# Patient Record
Sex: FEMALE | Race: BLACK OR AFRICAN AMERICAN | NOT HISPANIC OR LATINO
[De-identification: names, ages, dates, MRNs, and addresses within clinical notes are randomized per-mention and may not be internally consistent; named-entity substitution may affect disease eponyms.]

---

## 2018-04-03 PROBLEM — Z00.00 ENCOUNTER FOR PREVENTIVE HEALTH EXAMINATION: Status: ACTIVE | Noted: 2018-04-03

## 2018-04-10 ENCOUNTER — ASOB RESULT (OUTPATIENT)
Age: 38
End: 2018-04-10

## 2018-04-10 ENCOUNTER — APPOINTMENT (OUTPATIENT)
Dept: MATERNAL FETAL MEDICINE | Facility: CLINIC | Age: 38
End: 2018-04-10
Payer: MEDICAID

## 2018-04-10 PROCEDURE — G0109 DIAB MANAGE TRN IND/GROUP: CPT

## 2018-04-11 RX ORDER — BLOOD SUGAR DIAGNOSTIC
STRIP MISCELLANEOUS 4 TIMES DAILY
Qty: 1 | Refills: 4 | Status: ACTIVE | COMMUNITY
Start: 2018-04-10 | End: 1900-01-01

## 2018-04-11 RX ORDER — LANCETS
EACH MISCELLANEOUS
Qty: 1 | Refills: 4 | Status: ACTIVE | COMMUNITY
Start: 2018-04-10 | End: 1900-01-01

## 2018-04-26 ENCOUNTER — ASOB RESULT (OUTPATIENT)
Age: 38
End: 2018-04-26

## 2018-04-26 ENCOUNTER — APPOINTMENT (OUTPATIENT)
Dept: MATERNAL FETAL MEDICINE | Facility: CLINIC | Age: 38
End: 2018-04-26
Payer: MEDICAID

## 2018-04-26 PROCEDURE — G0108 DIAB MANAGE TRN  PER INDIV: CPT

## 2018-05-10 ENCOUNTER — OUTPATIENT (OUTPATIENT)
Dept: OUTPATIENT SERVICES | Facility: HOSPITAL | Age: 38
LOS: 1 days | End: 2018-05-10

## 2018-05-10 ENCOUNTER — ASOB RESULT (OUTPATIENT)
Age: 38
End: 2018-05-10

## 2018-05-10 ENCOUNTER — APPOINTMENT (OUTPATIENT)
Dept: ANTEPARTUM | Facility: CLINIC | Age: 38
End: 2018-05-10
Payer: MEDICAID

## 2018-05-10 ENCOUNTER — APPOINTMENT (OUTPATIENT)
Dept: ANTEPARTUM | Facility: HOSPITAL | Age: 38
End: 2018-05-10

## 2018-05-10 ENCOUNTER — APPOINTMENT (OUTPATIENT)
Dept: MATERNAL FETAL MEDICINE | Facility: CLINIC | Age: 38
End: 2018-05-10
Payer: MEDICAID

## 2018-05-10 PROCEDURE — 59025 FETAL NON-STRESS TEST: CPT | Mod: 26

## 2018-05-10 PROCEDURE — 76805 OB US >/= 14 WKS SNGL FETUS: CPT

## 2018-05-10 PROCEDURE — G0108 DIAB MANAGE TRN  PER INDIV: CPT

## 2018-05-16 DIAGNOSIS — O99.213 OBESITY COMPLICATING PREGNANCY, THIRD TRIMESTER: ICD-10-CM

## 2018-05-16 DIAGNOSIS — O09.523 SUPERVISION OF ELDERLY MULTIGRAVIDA, THIRD TRIMESTER: ICD-10-CM

## 2018-05-16 DIAGNOSIS — O24.419 GESTATIONAL DIABETES MELLITUS IN PREGNANCY, UNSPECIFIED CONTROL: ICD-10-CM

## 2018-05-17 ENCOUNTER — APPOINTMENT (OUTPATIENT)
Dept: ANTEPARTUM | Facility: HOSPITAL | Age: 38
End: 2018-05-17

## 2018-05-17 ENCOUNTER — ASOB RESULT (OUTPATIENT)
Age: 38
End: 2018-05-17

## 2018-05-17 ENCOUNTER — OUTPATIENT (OUTPATIENT)
Dept: OUTPATIENT SERVICES | Facility: HOSPITAL | Age: 38
LOS: 1 days | End: 2018-05-17

## 2018-05-17 ENCOUNTER — APPOINTMENT (OUTPATIENT)
Dept: ANTEPARTUM | Facility: CLINIC | Age: 38
End: 2018-05-17
Payer: MEDICAID

## 2018-05-17 PROCEDURE — 76818 FETAL BIOPHYS PROFILE W/NST: CPT | Mod: 26

## 2018-05-24 ENCOUNTER — ASOB RESULT (OUTPATIENT)
Age: 38
End: 2018-05-24

## 2018-05-24 ENCOUNTER — OUTPATIENT (OUTPATIENT)
Dept: OUTPATIENT SERVICES | Facility: HOSPITAL | Age: 38
LOS: 1 days | End: 2018-05-24

## 2018-05-24 ENCOUNTER — APPOINTMENT (OUTPATIENT)
Dept: ANTEPARTUM | Facility: CLINIC | Age: 38
End: 2018-05-24
Payer: MEDICAID

## 2018-05-24 ENCOUNTER — APPOINTMENT (OUTPATIENT)
Dept: ANTEPARTUM | Facility: HOSPITAL | Age: 38
End: 2018-05-24

## 2018-05-24 PROCEDURE — 76818 FETAL BIOPHYS PROFILE W/NST: CPT | Mod: 26

## 2018-05-29 DIAGNOSIS — O40.3XX0 POLYHYDRAMNIOS, THIRD TRIMESTER, NOT APPLICABLE OR UNSPECIFIED: ICD-10-CM

## 2018-05-29 DIAGNOSIS — O24.419 GESTATIONAL DIABETES MELLITUS IN PREGNANCY, UNSPECIFIED CONTROL: ICD-10-CM

## 2018-05-29 DIAGNOSIS — O99.213 OBESITY COMPLICATING PREGNANCY, THIRD TRIMESTER: ICD-10-CM

## 2018-05-29 DIAGNOSIS — O09.523 SUPERVISION OF ELDERLY MULTIGRAVIDA, THIRD TRIMESTER: ICD-10-CM

## 2018-05-29 DIAGNOSIS — Z3A.38 38 WEEKS GESTATION OF PREGNANCY: ICD-10-CM

## 2018-05-31 ENCOUNTER — ASOB RESULT (OUTPATIENT)
Age: 38
End: 2018-05-31

## 2018-05-31 ENCOUNTER — APPOINTMENT (OUTPATIENT)
Dept: ANTEPARTUM | Facility: HOSPITAL | Age: 38
End: 2018-05-31

## 2018-05-31 ENCOUNTER — APPOINTMENT (OUTPATIENT)
Dept: ANTEPARTUM | Facility: CLINIC | Age: 38
End: 2018-05-31
Payer: MEDICAID

## 2018-05-31 PROCEDURE — 76816 OB US FOLLOW-UP PER FETUS: CPT

## 2018-05-31 PROCEDURE — 76818 FETAL BIOPHYS PROFILE W/NST: CPT | Mod: 26

## 2018-06-06 ENCOUNTER — RESULT REVIEW (OUTPATIENT)
Age: 38
End: 2018-06-06

## 2018-06-06 ENCOUNTER — INPATIENT (INPATIENT)
Facility: HOSPITAL | Age: 38
LOS: 1 days | Discharge: HOME | End: 2018-06-08
Attending: OBSTETRICS & GYNECOLOGY | Admitting: OBSTETRICS & GYNECOLOGY
Payer: MEDICAID

## 2018-06-06 VITALS — WEIGHT: 180.78 LBS | HEIGHT: 64 IN

## 2018-06-06 DIAGNOSIS — Z3A.00 WEEKS OF GESTATION OF PREGNANCY NOT SPECIFIED: ICD-10-CM

## 2018-06-06 DIAGNOSIS — O26.90 PREGNANCY RELATED CONDITIONS, UNSPECIFIED, UNSPECIFIED TRIMESTER: ICD-10-CM

## 2018-06-06 LAB
BASOPHILS # BLD AUTO: 0.03 K/UL — SIGNIFICANT CHANGE UP (ref 0–0.2)
BASOPHILS NFR BLD AUTO: 0.3 % — SIGNIFICANT CHANGE UP (ref 0–2)
BLD GP AB SCN SERPL QL: NEGATIVE — SIGNIFICANT CHANGE UP
EOSINOPHIL # BLD AUTO: 0.05 K/UL — SIGNIFICANT CHANGE UP (ref 0–0.5)
EOSINOPHIL NFR BLD AUTO: 0.5 % — SIGNIFICANT CHANGE UP (ref 0–6)
HCT VFR BLD CALC: 37.7 % — SIGNIFICANT CHANGE UP (ref 34.5–45)
HGB BLD-MCNC: 12.1 G/DL — SIGNIFICANT CHANGE UP (ref 11.5–15.5)
IMM GRANULOCYTES # BLD AUTO: 0.04 # — SIGNIFICANT CHANGE UP
IMM GRANULOCYTES NFR BLD AUTO: 0.4 % — SIGNIFICANT CHANGE UP (ref 0–1.5)
LYMPHOCYTES # BLD AUTO: 2.76 K/UL — SIGNIFICANT CHANGE UP (ref 1–3.3)
LYMPHOCYTES # BLD AUTO: 25.7 % — SIGNIFICANT CHANGE UP (ref 13–44)
MCHC RBC-ENTMCNC: 27 PG — SIGNIFICANT CHANGE UP (ref 27–34)
MCHC RBC-ENTMCNC: 32.1 % — SIGNIFICANT CHANGE UP (ref 32–36)
MCV RBC AUTO: 84.2 FL — SIGNIFICANT CHANGE UP (ref 80–100)
MONOCYTES # BLD AUTO: 0.89 K/UL — SIGNIFICANT CHANGE UP (ref 0–0.9)
MONOCYTES NFR BLD AUTO: 8.3 % — SIGNIFICANT CHANGE UP (ref 2–14)
NEUTROPHILS # BLD AUTO: 6.95 K/UL — SIGNIFICANT CHANGE UP (ref 1.8–7.4)
NEUTROPHILS NFR BLD AUTO: 64.8 % — SIGNIFICANT CHANGE UP (ref 43–77)
NRBC # FLD: 0 — SIGNIFICANT CHANGE UP
PLATELET # BLD AUTO: 194 K/UL — SIGNIFICANT CHANGE UP (ref 150–400)
PMV BLD: 11.2 FL — SIGNIFICANT CHANGE UP (ref 7–13)
RBC # BLD: 4.48 M/UL — SIGNIFICANT CHANGE UP (ref 3.8–5.2)
RBC # FLD: 15 % — HIGH (ref 10.3–14.5)
RH IG SCN BLD-IMP: POSITIVE — SIGNIFICANT CHANGE UP
RH IG SCN BLD-IMP: POSITIVE — SIGNIFICANT CHANGE UP
T PALLIDUM AB TITR SER: NEGATIVE — SIGNIFICANT CHANGE UP
WBC # BLD: 10.72 K/UL — HIGH (ref 3.8–10.5)
WBC # FLD AUTO: 10.72 K/UL — HIGH (ref 3.8–10.5)

## 2018-06-06 PROCEDURE — 88307 TISSUE EXAM BY PATHOLOGIST: CPT | Mod: 26

## 2018-06-06 RX ORDER — OXYTOCIN 10 UNIT/ML
41.67 VIAL (ML) INJECTION
Qty: 20 | Refills: 0 | Status: DISCONTINUED | OUTPATIENT
Start: 2018-06-06 | End: 2018-06-07

## 2018-06-06 RX ORDER — OXYCODONE HYDROCHLORIDE 5 MG/1
5 TABLET ORAL EVERY 4 HOURS
Qty: 0 | Refills: 0 | Status: DISCONTINUED | OUTPATIENT
Start: 2018-06-06 | End: 2018-06-08

## 2018-06-06 RX ORDER — LANOLIN
1 OINTMENT (GRAM) TOPICAL EVERY 6 HOURS
Qty: 0 | Refills: 0 | Status: DISCONTINUED | OUTPATIENT
Start: 2018-06-06 | End: 2018-06-08

## 2018-06-06 RX ORDER — OXYTOCIN 10 UNIT/ML
333.33 VIAL (ML) INJECTION
Qty: 20 | Refills: 0 | Status: COMPLETED | OUTPATIENT
Start: 2018-06-06 | End: 2018-06-06

## 2018-06-06 RX ORDER — SIMETHICONE 80 MG/1
80 TABLET, CHEWABLE ORAL EVERY 6 HOURS
Qty: 0 | Refills: 0 | Status: DISCONTINUED | OUTPATIENT
Start: 2018-06-06 | End: 2018-06-08

## 2018-06-06 RX ORDER — SODIUM CHLORIDE 9 MG/ML
1000 INJECTION, SOLUTION INTRAVENOUS
Qty: 0 | Refills: 0 | Status: DISCONTINUED | OUTPATIENT
Start: 2018-06-06 | End: 2018-06-06

## 2018-06-06 RX ORDER — KETOROLAC TROMETHAMINE 30 MG/ML
30 SYRINGE (ML) INJECTION ONCE
Qty: 0 | Refills: 0 | Status: DISCONTINUED | OUTPATIENT
Start: 2018-06-06 | End: 2018-06-06

## 2018-06-06 RX ORDER — SODIUM CHLORIDE 9 MG/ML
1000 INJECTION, SOLUTION INTRAVENOUS ONCE
Qty: 0 | Refills: 0 | Status: COMPLETED | OUTPATIENT
Start: 2018-06-06 | End: 2018-06-06

## 2018-06-06 RX ORDER — TETANUS TOXOID, REDUCED DIPHTHERIA TOXOID AND ACELLULAR PERTUSSIS VACCINE, ADSORBED 5; 2.5; 8; 8; 2.5 [IU]/.5ML; [IU]/.5ML; UG/.5ML; UG/.5ML; UG/.5ML
0.5 SUSPENSION INTRAMUSCULAR ONCE
Qty: 0 | Refills: 0 | Status: DISCONTINUED | OUTPATIENT
Start: 2018-06-06 | End: 2018-06-08

## 2018-06-06 RX ORDER — DOCUSATE SODIUM 100 MG
100 CAPSULE ORAL
Qty: 0 | Refills: 0 | Status: DISCONTINUED | OUTPATIENT
Start: 2018-06-06 | End: 2018-06-08

## 2018-06-06 RX ORDER — AER TRAVELER 0.5 G/1
1 SOLUTION RECTAL; TOPICAL EVERY 4 HOURS
Qty: 0 | Refills: 0 | Status: DISCONTINUED | OUTPATIENT
Start: 2018-06-06 | End: 2018-06-08

## 2018-06-06 RX ORDER — MAGNESIUM HYDROXIDE 400 MG/1
30 TABLET, CHEWABLE ORAL
Qty: 0 | Refills: 0 | Status: DISCONTINUED | OUTPATIENT
Start: 2018-06-06 | End: 2018-06-08

## 2018-06-06 RX ORDER — ACETAMINOPHEN 500 MG
975 TABLET ORAL EVERY 6 HOURS
Qty: 0 | Refills: 0 | Status: DISCONTINUED | OUTPATIENT
Start: 2018-06-06 | End: 2018-06-06

## 2018-06-06 RX ORDER — SODIUM CHLORIDE 9 MG/ML
3 INJECTION INTRAMUSCULAR; INTRAVENOUS; SUBCUTANEOUS EVERY 8 HOURS
Qty: 0 | Refills: 0 | Status: DISCONTINUED | OUTPATIENT
Start: 2018-06-06 | End: 2018-06-08

## 2018-06-06 RX ORDER — OXYTOCIN 10 UNIT/ML
41.67 VIAL (ML) INJECTION
Qty: 20 | Refills: 0 | Status: DISCONTINUED | OUTPATIENT
Start: 2018-06-06 | End: 2018-06-06

## 2018-06-06 RX ORDER — HYDROCORTISONE 1 %
1 OINTMENT (GRAM) TOPICAL EVERY 4 HOURS
Qty: 0 | Refills: 0 | Status: DISCONTINUED | OUTPATIENT
Start: 2018-06-06 | End: 2018-06-08

## 2018-06-06 RX ORDER — GLYCERIN ADULT
1 SUPPOSITORY, RECTAL RECTAL AT BEDTIME
Qty: 0 | Refills: 0 | Status: DISCONTINUED | OUTPATIENT
Start: 2018-06-06 | End: 2018-06-08

## 2018-06-06 RX ORDER — HYDROCORTISONE 1 %
1 OINTMENT (GRAM) TOPICAL EVERY 4 HOURS
Qty: 0 | Refills: 0 | Status: DISCONTINUED | OUTPATIENT
Start: 2018-06-06 | End: 2018-06-06

## 2018-06-06 RX ORDER — IBUPROFEN 200 MG
600 TABLET ORAL EVERY 6 HOURS
Qty: 0 | Refills: 0 | Status: DISCONTINUED | OUTPATIENT
Start: 2018-06-06 | End: 2018-06-08

## 2018-06-06 RX ORDER — ACETAMINOPHEN 500 MG
975 TABLET ORAL EVERY 6 HOURS
Qty: 0 | Refills: 0 | Status: COMPLETED | OUTPATIENT
Start: 2018-06-06 | End: 2019-05-05

## 2018-06-06 RX ORDER — OXYTOCIN 10 UNIT/ML
333.33 VIAL (ML) INJECTION
Qty: 20 | Refills: 0 | Status: COMPLETED | OUTPATIENT
Start: 2018-06-06

## 2018-06-06 RX ORDER — OXYCODONE HYDROCHLORIDE 5 MG/1
5 TABLET ORAL
Qty: 0 | Refills: 0 | Status: DISCONTINUED | OUTPATIENT
Start: 2018-06-06 | End: 2018-06-08

## 2018-06-06 RX ORDER — PRAMOXINE HYDROCHLORIDE 150 MG/15G
1 AEROSOL, FOAM RECTAL EVERY 4 HOURS
Qty: 0 | Refills: 0 | Status: DISCONTINUED | OUTPATIENT
Start: 2018-06-06 | End: 2018-06-06

## 2018-06-06 RX ORDER — AER TRAVELER 0.5 G/1
1 SOLUTION RECTAL; TOPICAL EVERY 4 HOURS
Qty: 0 | Refills: 0 | Status: DISCONTINUED | OUTPATIENT
Start: 2018-06-06 | End: 2018-06-06

## 2018-06-06 RX ORDER — DIPHENHYDRAMINE HCL 50 MG
25 CAPSULE ORAL EVERY 6 HOURS
Qty: 0 | Refills: 0 | Status: DISCONTINUED | OUTPATIENT
Start: 2018-06-06 | End: 2018-06-08

## 2018-06-06 RX ORDER — ACETAMINOPHEN 500 MG
975 TABLET ORAL EVERY 6 HOURS
Qty: 0 | Refills: 0 | Status: DISCONTINUED | OUTPATIENT
Start: 2018-06-06 | End: 2018-06-08

## 2018-06-06 RX ORDER — DIBUCAINE 1 %
1 OINTMENT (GRAM) RECTAL EVERY 4 HOURS
Qty: 0 | Refills: 0 | Status: DISCONTINUED | OUTPATIENT
Start: 2018-06-06 | End: 2018-06-08

## 2018-06-06 RX ORDER — DIBUCAINE 1 %
1 OINTMENT (GRAM) RECTAL EVERY 4 HOURS
Qty: 0 | Refills: 0 | Status: DISCONTINUED | OUTPATIENT
Start: 2018-06-06 | End: 2018-06-06

## 2018-06-06 RX ORDER — SODIUM CHLORIDE 9 MG/ML
3 INJECTION INTRAMUSCULAR; INTRAVENOUS; SUBCUTANEOUS EVERY 8 HOURS
Qty: 0 | Refills: 0 | Status: DISCONTINUED | OUTPATIENT
Start: 2018-06-06 | End: 2018-06-06

## 2018-06-06 RX ORDER — IBUPROFEN 200 MG
600 TABLET ORAL EVERY 6 HOURS
Qty: 0 | Refills: 0 | Status: DISCONTINUED | OUTPATIENT
Start: 2018-06-06 | End: 2018-06-06

## 2018-06-06 RX ORDER — IBUPROFEN 200 MG
600 TABLET ORAL EVERY 6 HOURS
Qty: 0 | Refills: 0 | Status: COMPLETED | OUTPATIENT
Start: 2018-06-06 | End: 2019-05-05

## 2018-06-06 RX ORDER — PRAMOXINE HYDROCHLORIDE 150 MG/15G
1 AEROSOL, FOAM RECTAL EVERY 4 HOURS
Qty: 0 | Refills: 0 | Status: DISCONTINUED | OUTPATIENT
Start: 2018-06-06 | End: 2018-06-08

## 2018-06-06 RX ADMIN — Medication 125 MILLIUNIT(S)/MIN: at 10:03

## 2018-06-06 RX ADMIN — Medication 1000 MILLIUNIT(S)/MIN: at 10:02

## 2018-06-06 RX ADMIN — Medication 975 MILLIGRAM(S): at 23:57

## 2018-06-06 RX ADMIN — Medication 975 MILLIGRAM(S): at 18:15

## 2018-06-06 RX ADMIN — Medication 600 MILLIGRAM(S): at 17:45

## 2018-06-06 RX ADMIN — Medication 600 MILLIGRAM(S): at 18:15

## 2018-06-06 RX ADMIN — Medication 975 MILLIGRAM(S): at 17:45

## 2018-06-06 RX ADMIN — SODIUM CHLORIDE 3 MILLILITER(S): 9 INJECTION INTRAMUSCULAR; INTRAVENOUS; SUBCUTANEOUS at 22:00

## 2018-06-06 RX ADMIN — Medication 30 MILLIGRAM(S): at 10:03

## 2018-06-06 RX ADMIN — Medication 600 MILLIGRAM(S): at 23:57

## 2018-06-06 RX ADMIN — SODIUM CHLORIDE 2000 MILLILITER(S): 9 INJECTION, SOLUTION INTRAVENOUS at 04:47

## 2018-06-06 RX ADMIN — Medication 30 MILLIGRAM(S): at 10:22

## 2018-06-06 RX ADMIN — Medication 25 MILLIGRAM(S): at 10:03

## 2018-06-07 RX ADMIN — Medication 975 MILLIGRAM(S): at 06:10

## 2018-06-07 RX ADMIN — SODIUM CHLORIDE 3 MILLILITER(S): 9 INJECTION INTRAMUSCULAR; INTRAVENOUS; SUBCUTANEOUS at 21:58

## 2018-06-07 RX ADMIN — Medication 600 MILLIGRAM(S): at 00:10

## 2018-06-07 RX ADMIN — Medication 600 MILLIGRAM(S): at 06:10

## 2018-06-07 RX ADMIN — Medication 600 MILLIGRAM(S): at 05:49

## 2018-06-07 RX ADMIN — Medication 975 MILLIGRAM(S): at 00:10

## 2018-06-07 RX ADMIN — Medication 1 TABLET(S): at 14:56

## 2018-06-07 RX ADMIN — Medication 975 MILLIGRAM(S): at 05:49

## 2018-06-07 RX ADMIN — Medication 100 MILLIGRAM(S): at 14:55

## 2018-06-07 NOTE — PROGRESS NOTE ADULT - ASSESSMENT
Assessment:  stable postpartum  labs wnl  breastfeeding well    Plan:  encourage ambulation and po fluids  Encourage breastfeeding  anticipate discharge home tomorrow Assessment:    1. PPD# 1 s/p   2. GDM-A1    Stable postpartum  FS WNL  Breastfeeding well  Encourage ambulation and po fluids  Encourage breastfeeding  Anticipate discharge home tomorrow  D/C instructions given  Will need 2h GTT at [facility]  Motrin PRN for pain  F/U in 5 to 6 weeks with Dr. Augustin Assessment:    Pt. speaks Creole and some english, her sister-in-law interpreted for her MsPalma Cuevas    1. PPD# 1 s/p   2. GDM-A1    Stable postpartum  FS WNL  Breastfeeding well  Encourage ambulation and po fluids  Encourage breastfeeding  Anticipate discharge home tomorrow  D/C instructions given  Will need 2h GTT at [facility]  Motrin PRN for pain  F/U in 5 to 6 weeks with Dr. Augustin Assessment:    Pt. speaks Creole and some english, her sister-in-law interpreted for her MsPalma Cuevas    1. PPD# 1 s/p   2. GDM-A1    Stable postpartum  FS WNL  Breastfeeding well  Encourage ambulation and po fluids  Encourage breastfeeding  Anticipate discharge home tomorrow  D/C instructions given  Will need 2h GTT at [facility]  Motrin PRN for pain  F/U in 5 to 6 weeks with Dr. Chan

## 2018-06-07 NOTE — PROGRESS NOTE ADULT - SUBJECTIVE AND OBJECTIVE BOX
OB Attending on call:    Patient evaluated at bedside.  Patient's pain is well controlled with motrin. Patient denies headache, dizziness, chest pain, palpitations, shortness of breath, nausea, vomiting, heavy vaginal bleeding or perineal discomfort. Patient has been ambulating without assistance, voiding spontaneously, tolerating diet, and is breastfeeding.    Physical Exam:  Vital Signs Last 24 Hrs  T(C): 36.6 (07 Jun 2018 05:45), Max: 36.8 (06 Jun 2018 18:35)  T(F): 97.8 (07 Jun 2018 05:45), Max: 98.3 (06 Jun 2018 18:35)  HR: 79 (07 Jun 2018 05:45) (79 - 87)  BP: 131/80 (07 Jun 2018 05:45) (116/65 - 131/80)  BP(mean): --  RR: 18 (07 Jun 2018 05:45) (18 - 19)  SpO2: 100% (07 Jun 2018 05:45) (99% - 100%)  I&O's Summary    06 Jun 2018 07:01  -  07 Jun 2018 07:00  --------------------------------------------------------  IN: 0 mL / OUT: 1600 mL / NET: -1600 mL        NAD, A+0 x 3  Breasts: soft, nontender, no palpable masses, nipples intact and everted  Abd:  soft, nontender, nondistended, no rebound or guarding, uterus firm at midline,   : lochia WNL  Extremities: no edema or calf tenderness bilaterally                        12.1   10.72 )-----------( 194      ( 06 Jun 2018 04:00 )             37.7 OB Attending on call: PPD#1 s/p     Patient evaluated at bedside.  Patient's pain is well controlled with motrin. Patient denies headache, dizziness, chest pain, palpitations, shortness of breath, nausea, vomiting, heavy vaginal bleeding or perineal discomfort. Patient has been ambulating without assistance, voiding spontaneously, tolerating diet, and is breastfeeding.    Physical Exam:  Vital Signs Last 24 Hrs  T(C): 36.6 (2018 05:45), Max: 36.8 (2018 18:35)  T(F): 97.8 (2018 05:45), Max: 98.3 (2018 18:35)  HR: 79 (2018 05:45) (79 - 87)  BP: 131/80 (2018 05:45) (116/65 - 131/80)  BP(mean): --  RR: 18 (2018 05:45) (18 - 19)  SpO2: 100% (2018 05:45) (99% - 100%)  I&O's Summary    2018 07:01  -  2018 07:00  --------------------------------------------------------  IN: 0 mL / OUT: 1600 mL / NET: -1600 mL        NAD, A+0 x 3  Breasts: soft, nontender, no palpable masses, nipples intact and everted  CVS: Positive s1S2, RRR  Lungs: CTA B/L, No W/R/R  Abd:  soft, nontender, nondistended, no rebound or guarding, uterus firm at midline,   : lochia WNL  Extremities: no edema or calf tenderness bilaterally                        12.1   10.72 )-----------( 194      ( 2018 04:00 )             37.7

## 2018-06-08 ENCOUNTER — APPOINTMENT (OUTPATIENT)
Dept: ANTEPARTUM | Facility: HOSPITAL | Age: 38
End: 2018-06-08

## 2018-06-08 ENCOUNTER — APPOINTMENT (OUTPATIENT)
Dept: ANTEPARTUM | Facility: CLINIC | Age: 38
End: 2018-06-08

## 2018-06-08 ENCOUNTER — TRANSCRIPTION ENCOUNTER (OUTPATIENT)
Age: 38
End: 2018-06-08

## 2018-06-08 VITALS
DIASTOLIC BLOOD PRESSURE: 88 MMHG | HEART RATE: 87 BPM | SYSTOLIC BLOOD PRESSURE: 137 MMHG | TEMPERATURE: 99 F | RESPIRATION RATE: 18 BRPM | OXYGEN SATURATION: 100 %

## 2018-06-08 RX ORDER — IBUPROFEN 200 MG
1 TABLET ORAL
Qty: 0 | Refills: 0 | COMMUNITY
Start: 2018-06-08

## 2018-06-08 RX ORDER — ACETAMINOPHEN 500 MG
3 TABLET ORAL
Qty: 0 | Refills: 0 | COMMUNITY
Start: 2018-06-08

## 2018-06-08 RX ADMIN — Medication 600 MILLIGRAM(S): at 06:27

## 2018-06-08 RX ADMIN — Medication 600 MILLIGRAM(S): at 06:51

## 2018-06-08 RX ADMIN — Medication 975 MILLIGRAM(S): at 06:26

## 2018-06-08 RX ADMIN — Medication 975 MILLIGRAM(S): at 06:51

## 2018-06-08 NOTE — DISCHARGE NOTE OB - CARE PROVIDER_API CALL
Elisabet Chan), Obstetrics and Gynecology  [address] [place] [address]  Phone: [phone]  Fax: [phone]

## 2018-06-08 NOTE — DISCHARGE NOTE OB - ADDITIONAL INSTRUCTIONS
Pelvic rest x 6 weeks  F/U with Dr. Chan in 5 to 6 weeks for postpartum check  She will need 2h GTT at that visit

## 2018-06-08 NOTE — DISCHARGE NOTE OB - PATIENT PORTAL LINK FT
You can access the [email or web address], offered by [facility], by registering with the following website: [email or web address]

## 2018-06-08 NOTE — DISCHARGE NOTE OB - HOSPITAL COURSE
39 yo  at 40+3/7 weeks IUP with GDM-A1 was admitted on 18 in active labor. She delivered a live male via  on 18 but had shoulder dystocia. Apgar 3/7/9 in 1/5/10 minutes. Weight 9 lbs 3 oz. Posterior shoulder was delivered first. [place] had no deficit. Her postpartum course was unremarkable. VS were stable and her FS were normal. She was D/Cd home on PPD#2 in stable condition

## 2018-06-08 NOTE — DISCHARGE NOTE OB - IF LEAKING COLOSTRUM, UTILIZE BREAST PADS.  DO NOT USE VAGINAL MINI PADS FOR THIS PURPOSE
Breath sounds are clear, no distress present, no wheeze, rales, rhonchi or tachypnea. Normal rate and effort. Statement Selected

## 2018-06-08 NOTE — DISCHARGE NOTE OB - CARE PLAN
Principal Discharge DX:	 (normal spontaneous vaginal delivery)  Goal:	Full recovery in 5 to 6 weeks  Assessment and plan of treatment:	Regular diet, activity as tolerated, pelvic rest x 6 weeks  Secondary Diagnosis:	Shoulder dystocia, delivered, current hospitalization  Goal:	No fetal injury  Assessment and plan of treatment:	No fetal injury identified at delivery  Secondary Diagnosis:	GDM, class A1  Goal:	Resolution in 6 weeks

## 2018-06-08 NOTE — DISCHARGE NOTE OB - PLAN OF CARE
Full recovery in 5 to 6 weeks Regular diet, activity as tolerated, pelvic rest x 6 weeks No fetal injury No fetal injury identified at delivery Resolution in 6 weeks

## 2018-06-18 DIAGNOSIS — O09.523 SUPERVISION OF ELDERLY MULTIGRAVIDA, THIRD TRIMESTER: ICD-10-CM

## 2018-06-18 DIAGNOSIS — O24.419 GESTATIONAL DIABETES MELLITUS IN PREGNANCY, UNSPECIFIED CONTROL: ICD-10-CM

## 2018-06-18 DIAGNOSIS — O40.3XX0 POLYHYDRAMNIOS, THIRD TRIMESTER, NOT APPLICABLE OR UNSPECIFIED: ICD-10-CM

## 2018-10-17 ENCOUNTER — EMERGENCY (EMERGENCY)
Facility: HOSPITAL | Age: 38
LOS: 0 days | Discharge: HOME | End: 2018-10-17
Attending: EMERGENCY MEDICINE
Payer: COMMERCIAL

## 2018-10-17 VITALS
DIASTOLIC BLOOD PRESSURE: 81 MMHG | RESPIRATION RATE: 17 BRPM | TEMPERATURE: 98 F | SYSTOLIC BLOOD PRESSURE: 147 MMHG | OXYGEN SATURATION: 98 % | HEART RATE: 85 BPM

## 2018-10-17 VITALS
HEIGHT: 62 IN | HEART RATE: 94 BPM | OXYGEN SATURATION: 99 % | RESPIRATION RATE: 18 BRPM | TEMPERATURE: 98 F | SYSTOLIC BLOOD PRESSURE: 121 MMHG | WEIGHT: 160.06 LBS | DIASTOLIC BLOOD PRESSURE: 74 MMHG

## 2018-10-17 DIAGNOSIS — Z97.5 PRESENCE OF (INTRAUTERINE) CONTRACEPTIVE DEVICE: Chronic | ICD-10-CM

## 2018-10-17 DIAGNOSIS — R10.9 UNSPECIFIED ABDOMINAL PAIN: ICD-10-CM

## 2018-10-17 LAB
ALBUMIN SERPL ELPH-MCNC: 3.7 G/DL — SIGNIFICANT CHANGE UP (ref 3.3–5)
ALP SERPL-CCNC: 63 U/L — SIGNIFICANT CHANGE UP (ref 40–120)
ALT FLD-CCNC: 22 U/L — SIGNIFICANT CHANGE UP (ref 12–78)
ANION GAP SERPL CALC-SCNC: 7 MMOL/L — SIGNIFICANT CHANGE UP (ref 5–17)
APPEARANCE UR: ABNORMAL
APTT BLD: 38.6 SEC — HIGH (ref 27.5–37.4)
AST SERPL-CCNC: 17 U/L — SIGNIFICANT CHANGE UP (ref 15–37)
BILIRUB SERPL-MCNC: 0.4 MG/DL — SIGNIFICANT CHANGE UP (ref 0.2–1.2)
BILIRUB UR-MCNC: NEGATIVE — SIGNIFICANT CHANGE UP
BUN SERPL-MCNC: 13 MG/DL — SIGNIFICANT CHANGE UP (ref 7–23)
CALCIUM SERPL-MCNC: 8.6 MG/DL — SIGNIFICANT CHANGE UP (ref 8.5–10.1)
CHLORIDE SERPL-SCNC: 110 MMOL/L — HIGH (ref 96–108)
CO2 SERPL-SCNC: 25 MMOL/L — SIGNIFICANT CHANGE UP (ref 22–31)
COLOR SPEC: YELLOW — SIGNIFICANT CHANGE UP
CREAT SERPL-MCNC: 0.85 MG/DL — SIGNIFICANT CHANGE UP (ref 0.5–1.3)
DIFF PNL FLD: ABNORMAL
GLUCOSE SERPL-MCNC: 89 MG/DL — SIGNIFICANT CHANGE UP (ref 70–99)
GLUCOSE UR QL: NEGATIVE MG/DL — SIGNIFICANT CHANGE UP
HCG SERPL-ACNC: <1 MIU/ML — SIGNIFICANT CHANGE UP
HCT VFR BLD CALC: 41.5 % — SIGNIFICANT CHANGE UP (ref 34.5–45)
HGB BLD-MCNC: 13.2 G/DL — SIGNIFICANT CHANGE UP (ref 11.5–15.5)
INR BLD: 1.23 RATIO — HIGH (ref 0.88–1.16)
KETONES UR-MCNC: NEGATIVE — SIGNIFICANT CHANGE UP
LEUKOCYTE ESTERASE UR-ACNC: ABNORMAL
MCHC RBC-ENTMCNC: 27.4 PG — SIGNIFICANT CHANGE UP (ref 27–34)
MCHC RBC-ENTMCNC: 31.8 GM/DL — LOW (ref 32–36)
MCV RBC AUTO: 86.1 FL — SIGNIFICANT CHANGE UP (ref 80–100)
NITRITE UR-MCNC: NEGATIVE — SIGNIFICANT CHANGE UP
NRBC # BLD: 0 /100 WBCS — SIGNIFICANT CHANGE UP (ref 0–0)
PH UR: 7 — SIGNIFICANT CHANGE UP (ref 5–8)
PLATELET # BLD AUTO: 266 K/UL — SIGNIFICANT CHANGE UP (ref 150–400)
POTASSIUM SERPL-MCNC: 4 MMOL/L — SIGNIFICANT CHANGE UP (ref 3.5–5.3)
POTASSIUM SERPL-SCNC: 4 MMOL/L — SIGNIFICANT CHANGE UP (ref 3.5–5.3)
PROT SERPL-MCNC: 7.7 GM/DL — SIGNIFICANT CHANGE UP (ref 6–8.3)
PROT UR-MCNC: NEGATIVE MG/DL — SIGNIFICANT CHANGE UP
PROTHROM AB SERPL-ACNC: 13.5 SEC — HIGH (ref 9.8–12.7)
RBC # BLD: 4.82 M/UL — SIGNIFICANT CHANGE UP (ref 3.8–5.2)
RBC # FLD: 14 % — SIGNIFICANT CHANGE UP (ref 10.3–14.5)
SODIUM SERPL-SCNC: 142 MMOL/L — SIGNIFICANT CHANGE UP (ref 135–145)
SP GR SPEC: 1 — LOW (ref 1.01–1.02)
UROBILINOGEN FLD QL: NEGATIVE MG/DL — SIGNIFICANT CHANGE UP
WBC # BLD: 7.96 K/UL — SIGNIFICANT CHANGE UP (ref 3.8–10.5)
WBC # FLD AUTO: 7.96 K/UL — SIGNIFICANT CHANGE UP (ref 3.8–10.5)

## 2018-10-17 PROCEDURE — 76856 US EXAM PELVIC COMPLETE: CPT | Mod: 26

## 2018-10-17 PROCEDURE — 74177 CT ABD & PELVIS W/CONTRAST: CPT | Mod: 26

## 2018-10-17 PROCEDURE — 99284 EMERGENCY DEPT VISIT MOD MDM: CPT

## 2018-10-17 RX ORDER — IOHEXOL 300 MG/ML
30 INJECTION, SOLUTION INTRAVENOUS ONCE
Qty: 0 | Refills: 0 | Status: COMPLETED | OUTPATIENT
Start: 2018-10-17 | End: 2018-10-17

## 2018-10-17 RX ORDER — SODIUM CHLORIDE 9 MG/ML
1000 INJECTION INTRAMUSCULAR; INTRAVENOUS; SUBCUTANEOUS ONCE
Qty: 0 | Refills: 0 | Status: COMPLETED | OUTPATIENT
Start: 2018-10-17 | End: 2018-10-17

## 2018-10-17 RX ORDER — CIPROFLOXACIN LACTATE 400MG/40ML
1 VIAL (ML) INTRAVENOUS
Qty: 6 | Refills: 0 | OUTPATIENT
Start: 2018-10-17 | End: 2018-10-19

## 2018-10-17 RX ORDER — IBUPROFEN 200 MG
600 TABLET ORAL ONCE
Qty: 0 | Refills: 0 | Status: COMPLETED | OUTPATIENT
Start: 2018-10-17 | End: 2018-10-17

## 2018-10-17 RX ADMIN — Medication 600 MILLIGRAM(S): at 12:33

## 2018-10-17 RX ADMIN — IOHEXOL 30 MILLILITER(S): 300 INJECTION, SOLUTION INTRAVENOUS at 12:33

## 2018-10-17 RX ADMIN — SODIUM CHLORIDE 1000 MILLILITER(S): 9 INJECTION INTRAMUSCULAR; INTRAVENOUS; SUBCUTANEOUS at 12:33

## 2018-10-17 NOTE — ED PROVIDER NOTE - OBJECTIVE STATEMENT
37 yo F with L lower abd pain, started yesterday.  No associated with activity.  Pt. says it's severe, stops her from doing things.  Sharp stabbing pain, non-radiating.  No associated symptoms.  No vaginal discharge, no dysuria or urinary complaints.  Recently started having periods after delivery 4 months ago.  Pt. feels well otherwise.   ROS: negative for fever, cough, headache, chest pain, shortness of breath, nausea, vomiting, diarrhea, rash, paresthesia, and weakness--all other systems reviewed are negative.   PMH: negative; Meds: Denies; SH: Denies smoking/drinking/drug use

## 2018-10-17 NOTE — ED ADULT TRIAGE NOTE - CHIEF COMPLAINT QUOTE
left abdominal pains onset 15 minutes while at school sitting. Pain 10/10 Squeezing  LMP 10/13/2018 left abdominal pains onset 15 minutes while at school sitting. Pain 10/10 Squeezing  LMP 10/13/2018 Pt had a vaginal delivery 4 months ago

## 2018-10-17 NOTE — ED ADULT NURSE NOTE - CHIEF COMPLAINT QUOTE
left abdominal pains onset 15 minutes while at school sitting. Pain 10/10 Squeezing  LMP 10/13/2018 Pt had a vaginal delivery 4 months ago

## 2018-10-17 NOTE — ED PROVIDER NOTE - MEDICAL DECISION MAKING DETAILS
37 yo F with L adnexal pain, r/o torsion, r/o diverticulosis, ovarian cyst, pregnancy  -basic labs, coags, type and screen, hcg, ct abd/pel, US, ua, cx, Motrin  -f/u results, reeval

## 2018-10-17 NOTE — ED PROVIDER NOTE - PHYSICAL EXAMINATION
Vitals: WNL  Gen: AAOx3, NAD, sitting uncomfortably in stretcher, non-toxic  Head: ncat, perrla, eomi b/l  Neck: supple, no lymphadenopathy, no midline deviation  Heart: rrr, no m/r/g  Lungs: CTA b/l, no rales/ronchi/wheezes  Abd: soft, tender in L adnexal are and suprapubically, non-distended, no rebound or guarding  Ext: no clubbing/cyanosis/edema  Neuro: sensation and muscle strength intact b/l, steady gait Vitals: WNL  Gen: AAOx3, NAD, sitting uncomfortably in stretcher, non-toxic  Head: ncat, perrla, eomi b/l  Neck: supple, no lymphadenopathy, no midline deviation  Heart: rrr, no m/r/g  Lungs: CTA b/l, no rales/ronchi/wheezes  Abd: soft, tender in L adnexal are and suprapubically, non-distended, no rebound or guarding  Ext: no clubbing/cyanosis/edema  Neuro: sensation and muscle strength intact b/l, steady gait  Pelvic: performed with chaperone in ED; external genitalia normal, no lesions, vaginal canal has no blood or discharge, normal cervix, no bleeding, no CMT, cervix closed, no adnexal tenderness, no masses palpated

## 2018-10-17 NOTE — ED ADULT NURSE NOTE - CHPI ED NUR SYMPTOMS NEG
no blood in stool/no burning urination/no dysuria/no fever/no hematuria/no nausea/no chills/no diarrhea/no abdominal distension/no vomiting

## 2018-10-18 DIAGNOSIS — R10.9 UNSPECIFIED ABDOMINAL PAIN: ICD-10-CM

## 2018-10-18 DIAGNOSIS — R10.2 PELVIC AND PERINEAL PAIN: ICD-10-CM

## 2018-10-18 LAB
CULTURE RESULTS: SIGNIFICANT CHANGE UP
SPECIMEN SOURCE: SIGNIFICANT CHANGE UP

## 2019-03-24 ENCOUNTER — EMERGENCY (EMERGENCY)
Facility: HOSPITAL | Age: 39
LOS: 0 days | Discharge: HOME | End: 2019-03-24
Payer: COMMERCIAL

## 2019-03-24 VITALS
HEIGHT: 65 IN | OXYGEN SATURATION: 100 % | HEART RATE: 85 BPM | DIASTOLIC BLOOD PRESSURE: 87 MMHG | WEIGHT: 175.05 LBS | TEMPERATURE: 98 F | SYSTOLIC BLOOD PRESSURE: 135 MMHG | RESPIRATION RATE: 20 BRPM

## 2019-03-24 DIAGNOSIS — Z97.5 PRESENCE OF (INTRAUTERINE) CONTRACEPTIVE DEVICE: Chronic | ICD-10-CM
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
## 2019-03-24 DIAGNOSIS — M54.5 LOW BACK PAIN: ICD-10-CM
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
## 2019-03-24 DIAGNOSIS — M54.40 LUMBAGO WITH SCIATICA, UNSPECIFIED SIDE: ICD-10-CM
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
## 2019-03-24 PROCEDURE — 99283 EMERGENCY DEPT VISIT LOW MDM: CPT

## 2019-03-24 RX ORDER — IBUPROFEN 200 MG
1 TABLET ORAL
Qty: 20 | Refills: 0 | OUTPATIENT
Start: 2019-03-24

## 2019-03-24 RX ORDER — CYCLOBENZAPRINE HYDROCHLORIDE 10 MG/1
10 TABLET, FILM COATED ORAL ONCE
Qty: 0 | Refills: 0 | Status: COMPLETED | OUTPATIENT
Start: 2019-03-24 | End: 2019-03-24

## 2019-03-24 RX ORDER — CYCLOBENZAPRINE HYDROCHLORIDE 10 MG/1
1 TABLET, FILM COATED ORAL
Qty: 12 | Refills: 0 | OUTPATIENT
Start: 2019-03-24

## 2019-03-24 RX ORDER — KETOROLAC TROMETHAMINE 30 MG/ML
60 SYRINGE (ML) INJECTION ONCE
Qty: 0 | Refills: 0 | Status: DISCONTINUED | OUTPATIENT
Start: 2019-03-24 | End: 2019-03-24

## 2019-03-24 RX ADMIN — CYCLOBENZAPRINE HYDROCHLORIDE 10 MILLIGRAM(S): 10 TABLET, FILM COATED ORAL at 17:34

## 2019-03-24 RX ADMIN — Medication 60 MILLIGRAM(S): at 17:34

## 2019-03-24 NOTE — ED PROVIDER NOTE - CLINICAL SUMMARY MEDICAL DECISION MAKING FREE TEXT BOX
pt here with lower back pain s/p lifted at work, consistent with muscle strain, neuro exam unremarkable, no red flags, pt is ambulatory in ed, pt states she feels better after meds and ready for dc (pt not driving home) provided ortho spine fu and educated re fu needs, return precautions given

## 2019-03-24 NOTE — ED PROVIDER NOTE - OBJECTIVE STATEMENT
37 y/o female with no PMh here c./o lower back pain x 4 days. pt is creole speaking, offered  phone, declined stating she wants her sister who speaks english to interpret. 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 pain worse with movement. pt hasn't taken anything for the pain. no change in sensation. no weakness. no change in gait. no bowel or bladder incontinence. no fevers. no ivda. pt denies pregnancy.     ROS: No fever/chills. No eye pain/changes in vision, No ear pain/sore throat/dysphagia, No chest pain/palpitations. No SOB/cough/. No abdominal pain, N/V/D, no black/bloody bm. No dysuria/frequency/discharge, No headache. No Dizziness.    No rashes or breaks in skin. No numbness/tingling/weakness.

## 2019-03-24 NOTE — ED ADULT NURSE NOTE - OBJECTIVE STATEMENT
PT IS A 38 YEAR OLD FEMALE C/O OF PAIN IN THE UPPER BACK AREA THAT RADIATES TO THE LOWER BACK AREA THAT HAS BEEN ONGOING BUT IS NOW WORST FOR THE PAST 4 DAYS. DENIES ANY MEDICAL ISSUES, SMOKING, DRINKING, OR ILLICIT DRUGS.

## 2019-03-24 NOTE — ED ADULT TRIAGE NOTE - CHIEF COMPLAINT QUOTE
pt states, " I was at work on Wednesday and I am not sure how I hurt my back , " c/o lower back pain

## 2019-03-24 NOTE — ED ADULT NURSE NOTE - NSIMPLEMENTINTERV_GEN_ALL_ED
Implemented All Universal Safety Interventions:  [place] to call system. Call bell, personal items and telephone within reach. Instruct patient to call for assistance. Room bathroom lighting operational. Non-slip footwear when patient is off stretcher. Physically safe environment: no spills, clutter or unnecessary equipment. Stretcher in lowest position, wheels locked, appropriate side rails in place.

## 2019-03-24 NOTE — ED PROVIDER NOTE - PHYSICAL EXAMINATION
Gen: Alert, NAD, well appearing  Head: NC, AT, PERRL, EOMI, normal lids/conjunctiva  ENT: B TM WNL, normal hearing, patent oropharynx without erythema/exudate, uvula midline  Neck: +supple, no tenderness/meningismus/JVD, +Trachea midline  Pulm: Bilateral BS, normal resp effort, no wheeze/stridor/retractions  CV: RRR, no M/R/G, +dist pulses  Abd: soft, NT/ND, +BS, no hepatosplenomegaly  Mskel: no edema/erythema/cyanosis, no spinal tenderness ctls, +L lumbar paraspinal tenderness, no saddle aneshtesia, str 5/5 x4 bl, sensations intact, gait ok, able to slr  Skin: no rash  Neuro: AAOx3, no sensory/motor deficits, CN 2-12 intact

## 2019-05-16 NOTE — PATIENT PROFILE OB - HARM RISK FACTORS
Accompanied by Mom Jennifer    Parental Concerns   -still stomach aches often    [place]  city    Second Hand Smoke Exposure No    Pets Yes  Fish    Parental Hearing Concerns No    Parental Vision Concerns No    Dentist Yes    Cholesterol/Heart Risk  Low Risk    Lead Risk Low Risk  House <1960 and peeling paint No  House <1960 and renovations No  Hobbies with Pb exposure No  Sibs or Playmates with Pb poisoning No  Live near a Pb smelter or battery recycling center No    TB Risk: Low Risk  Child Born Outside of [place] No  Contact with anyone with TB No  Contact with anyone with positive PPD No      
no

## 2019-11-16 ENCOUNTER — EMERGENCY (EMERGENCY)
Facility: HOSPITAL | Age: 39
LOS: 0 days | Discharge: HOME | End: 2019-11-16
Attending: STUDENT IN AN ORGANIZED HEALTH CARE EDUCATION/TRAINING PROGRAM
Payer: COMMERCIAL

## 2019-11-16 VITALS
OXYGEN SATURATION: 100 % | SYSTOLIC BLOOD PRESSURE: 112 MMHG | HEART RATE: 93 BPM | TEMPERATURE: 98 F | DIASTOLIC BLOOD PRESSURE: 80 MMHG | RESPIRATION RATE: 18 BRPM

## 2019-11-16 VITALS
RESPIRATION RATE: 16 BRPM | OXYGEN SATURATION: 99 % | TEMPERATURE: 98 F | HEIGHT: 65 IN | SYSTOLIC BLOOD PRESSURE: 141 MMHG | WEIGHT: 184.97 LBS | HEART RATE: 95 BPM | DIASTOLIC BLOOD PRESSURE: 98 MMHG

## 2019-11-16 DIAGNOSIS — R10.2 PELVIC AND PERINEAL PAIN: ICD-10-CM

## 2019-11-16 DIAGNOSIS — R10.9 UNSPECIFIED ABDOMINAL PAIN: ICD-10-CM

## 2019-11-16 DIAGNOSIS — Z97.5 PRESENCE OF (INTRAUTERINE) CONTRACEPTIVE DEVICE: Chronic | ICD-10-CM

## 2019-11-16 LAB
ALBUMIN SERPL ELPH-MCNC: 3.8 G/DL — SIGNIFICANT CHANGE UP (ref 3.3–5)
ALP SERPL-CCNC: 57 U/L — SIGNIFICANT CHANGE UP (ref 40–120)
ALT FLD-CCNC: 16 U/L — SIGNIFICANT CHANGE UP (ref 12–78)
ANION GAP SERPL CALC-SCNC: 3 MMOL/L — LOW (ref 5–17)
APPEARANCE UR: CLEAR — SIGNIFICANT CHANGE UP
AST SERPL-CCNC: 13 U/L — LOW (ref 15–37)
BASOPHILS # BLD AUTO: 0.03 K/UL — SIGNIFICANT CHANGE UP (ref 0–0.2)
BASOPHILS NFR BLD AUTO: 0.4 % — SIGNIFICANT CHANGE UP (ref 0–2)
BILIRUB SERPL-MCNC: 0.3 MG/DL — SIGNIFICANT CHANGE UP (ref 0.2–1.2)
BILIRUB UR-MCNC: NEGATIVE — SIGNIFICANT CHANGE UP
BUN SERPL-MCNC: 9 MG/DL — SIGNIFICANT CHANGE UP (ref 7–23)
CALCIUM SERPL-MCNC: 9.3 MG/DL — SIGNIFICANT CHANGE UP (ref 8.5–10.1)
CHLORIDE SERPL-SCNC: 108 MMOL/L — SIGNIFICANT CHANGE UP (ref 96–108)
CO2 SERPL-SCNC: 27 MMOL/L — SIGNIFICANT CHANGE UP (ref 22–31)
COLOR SPEC: YELLOW — SIGNIFICANT CHANGE UP
CREAT SERPL-MCNC: 0.86 MG/DL — SIGNIFICANT CHANGE UP (ref 0.5–1.3)
DIFF PNL FLD: NEGATIVE — SIGNIFICANT CHANGE UP
EOSINOPHIL # BLD AUTO: 0.05 K/UL — SIGNIFICANT CHANGE UP (ref 0–0.5)
EOSINOPHIL NFR BLD AUTO: 0.7 % — SIGNIFICANT CHANGE UP (ref 0–6)
GLUCOSE SERPL-MCNC: 93 MG/DL — SIGNIFICANT CHANGE UP (ref 70–99)
GLUCOSE UR QL: NEGATIVE MG/DL — SIGNIFICANT CHANGE UP
HCG SERPL-ACNC: <1 MIU/ML — SIGNIFICANT CHANGE UP
HCT VFR BLD CALC: 42.6 % — SIGNIFICANT CHANGE UP (ref 34.5–45)
HGB BLD-MCNC: 13.3 G/DL — SIGNIFICANT CHANGE UP (ref 11.5–15.5)
IMM GRANULOCYTES NFR BLD AUTO: 0.3 % — SIGNIFICANT CHANGE UP (ref 0–1.5)
KETONES UR-MCNC: NEGATIVE — SIGNIFICANT CHANGE UP
LEUKOCYTE ESTERASE UR-ACNC: NEGATIVE — SIGNIFICANT CHANGE UP
LYMPHOCYTES # BLD AUTO: 1.82 K/UL — SIGNIFICANT CHANGE UP (ref 1–3.3)
LYMPHOCYTES # BLD AUTO: 26.8 % — SIGNIFICANT CHANGE UP (ref 13–44)
MCHC RBC-ENTMCNC: 27 PG — SIGNIFICANT CHANGE UP (ref 27–34)
MCHC RBC-ENTMCNC: 31.2 GM/DL — LOW (ref 32–36)
MCV RBC AUTO: 86.4 FL — SIGNIFICANT CHANGE UP (ref 80–100)
MONOCYTES # BLD AUTO: 0.44 K/UL — SIGNIFICANT CHANGE UP (ref 0–0.9)
MONOCYTES NFR BLD AUTO: 6.5 % — SIGNIFICANT CHANGE UP (ref 2–14)
NEUTROPHILS # BLD AUTO: 4.44 K/UL — SIGNIFICANT CHANGE UP (ref 1.8–7.4)
NEUTROPHILS NFR BLD AUTO: 65.3 % — SIGNIFICANT CHANGE UP (ref 43–77)
NITRITE UR-MCNC: NEGATIVE — SIGNIFICANT CHANGE UP
NRBC # BLD: 0 /100 WBCS — SIGNIFICANT CHANGE UP (ref 0–0)
PH UR: 7 — SIGNIFICANT CHANGE UP (ref 5–8)
PLATELET # BLD AUTO: 249 K/UL — SIGNIFICANT CHANGE UP (ref 150–400)
POTASSIUM SERPL-MCNC: 4.1 MMOL/L — SIGNIFICANT CHANGE UP (ref 3.5–5.3)
POTASSIUM SERPL-SCNC: 4.1 MMOL/L — SIGNIFICANT CHANGE UP (ref 3.5–5.3)
PROT SERPL-MCNC: 7.8 GM/DL — SIGNIFICANT CHANGE UP (ref 6–8.3)
PROT UR-MCNC: NEGATIVE MG/DL — SIGNIFICANT CHANGE UP
RBC # BLD: 4.93 M/UL — SIGNIFICANT CHANGE UP (ref 3.8–5.2)
RBC # FLD: 14.2 % — SIGNIFICANT CHANGE UP (ref 10.3–14.5)
SODIUM SERPL-SCNC: 138 MMOL/L — SIGNIFICANT CHANGE UP (ref 135–145)
SP GR SPEC: 1 — LOW (ref 1.01–1.02)
UROBILINOGEN FLD QL: NEGATIVE MG/DL — SIGNIFICANT CHANGE UP
WBC # BLD: 6.8 K/UL — SIGNIFICANT CHANGE UP (ref 3.8–10.5)
WBC # FLD AUTO: 6.8 K/UL — SIGNIFICANT CHANGE UP (ref 3.8–10.5)

## 2019-11-16 PROCEDURE — 74177 CT ABD & PELVIS W/CONTRAST: CPT | Mod: 26

## 2019-11-16 PROCEDURE — 99284 EMERGENCY DEPT VISIT MOD MDM: CPT

## 2019-11-16 PROCEDURE — 76830 TRANSVAGINAL US NON-OB: CPT | Mod: 26

## 2019-11-16 RX ORDER — KETOROLAC TROMETHAMINE 30 MG/ML
15 SYRINGE (ML) INJECTION ONCE
Refills: 0 | Status: DISCONTINUED | OUTPATIENT
Start: 2019-11-16 | End: 2019-11-16

## 2019-11-16 RX ORDER — TRAMADOL HYDROCHLORIDE 50 MG/1
50 TABLET ORAL ONCE
Refills: 0 | Status: DISCONTINUED | OUTPATIENT
Start: 2019-11-16 | End: 2019-11-16

## 2019-11-16 RX ADMIN — TRAMADOL HYDROCHLORIDE 50 MILLIGRAM(S): 50 TABLET ORAL at 14:28

## 2019-11-16 RX ADMIN — Medication 15 MILLIGRAM(S): at 11:00

## 2019-11-16 RX ADMIN — Medication 15 MILLIGRAM(S): at 11:30

## 2019-11-16 NOTE — ED PROVIDER NOTE - NSFOLLOWUPINSTRUCTIONS_ED_ALL_ED_FT
Please return to Emergency Department immediately for any new, concerning, or worsening symptoms.   Please follow-up with gyn as recommended.    Please take prescriptions as discussed.

## 2019-11-16 NOTE — ED ADULT TRIAGE NOTE - CHIEF COMPLAINT QUOTE
lower abdominal pains since Friday. The pain is "strong" today. She has nausea, but no diarrhea or vomiting. LMP 10/26/2019. She has an IUD

## 2019-11-16 NOTE — ED PROVIDER NOTE - CARE PROVIDER_API CALL
Carolyn Elizondo (DO)  Obstetrics and Gynecology  [address] [place] [address]  Phone: [phone]  Fax: [phone]  Follow Up Time: 1-3 Days

## 2019-11-16 NOTE — ED PROVIDER NOTE - PROGRESS NOTE DETAILS
I discussed the case with Xena, office stafff at Dr. Elizondo's office call center. Dr. Elizondo will return to office on Monday.  Patient scheduled for next available appointment on 11/27/19 - 9AM but Xena states she will contact Dr. Elizondo's care team will contact patient to make a sooner appointment. I discussed the case with Xena, office stafff at Dr. Elizondo's office call center. Dr. Elizondo will return to office on Monday.  Patient scheduled for next available appointment on 11/27/19 - 9AM but Xena states she will contact Dr. Elizondo's care team will contact patient to make a sooner appointment. Patient agrees with this plan. I discussed all the results of the workup performed in ER today and advised the patient on outpatient management. Patient expresses full understanding and agrees to follow up with gyn. Patient was seen and examined at bedside. Reports feeling much better after medications. Wants to be discharged home. Patient appears to be improved.

## 2019-11-16 NOTE — ED PROVIDER NOTE - CLINICAL SUMMARY MEDICAL DECISION MAKING FREE TEXT BOX
Female presents to ED for evaluation of suprapubic pain, dysuria - Will check labs, imaging, medicate, reassess

## 2019-11-16 NOTE — ED PROVIDER NOTE - PHYSICAL EXAMINATION
Gen: no acute distress, well appearing, awake, alert and oriented x 3  Cardiac: regular rate and rhythm, +S1S2  Pulm: Clear to auscultation bilaterally  Abd: soft, +suprapubic ttp, neg Mcburneys', neg murphys, nondistended, no guarding  Back: neg CVA ttp, nontender spine  Extremity: no edema, no deformity, warm and well perfused, FROM all extremities    Neuro: awake, alert, oriented x 3, sensorimotor intact

## 2019-11-16 NOTE — ED PROVIDER NOTE - PATIENT PORTAL LINK FT
You can access the FollowMyHealth Patient Portal offered by [facility] by registering at the following website: [email or web address]. By joining [employer]’s FollowMyHealth portal, you will also be able to view your health information using other applications (apps) compatible with our system.

## 2019-11-16 NOTE — ED PROVIDER NOTE - NS ED ROS FT
Constitutional: no fevers or chills  Cardiac: no palpitations, chest pain  Lungs: no shortness of breath, wheezes  Abd: +abd pain, denies nausea, vomiting, diarrhea  Genitourinary: + dysuria, denies increased urinary frequency, hematuria  Neurology: no sensorimotor deficits, no dizziness, no headache, no visual changes  Skin: no rashes  All other ROS negative except as per HPI

## 2019-11-16 NOTE — ED PROVIDER NOTE - OBJECTIVE STATEMENT
38 yo female with presents to ED for evaluation of pelvic pain x 2 weeks, acutely worsened 2 days ago. 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 Patient reports she has had IUD in place for 1 year but reports that she has been having pain with it and was told she would need to leave IUD in for another 6 months. Reports she is now having +suprapubic pain, dysuria. Denies vaginal bleeding or discharge. Denies fevers, chills, nausea, vomiting, flank pain. Reports she took Motrin yesterday with improvement in symptoms.   Gyn: Dr. Carolyn Elizondo

## 2019-11-16 NOTE — ED ADULT NURSE NOTE - NSIMPLEMENTINTERV_GEN_ALL_ED
Implemented All Universal Safety Interventions:  [place] to call system. Call bell, personal items and telephone within reach. Instruct patient to call for assistance. Room bathroom lighting operational. Non-slip footwear when patient is off stretcher. Physically safe environment: no spills, clutter or unnecessary equipment. Stretcher in lowest position, wheels locked, appropriate side rails in place.

## 2019-11-17 LAB
CULTURE RESULTS: SIGNIFICANT CHANGE UP
SPECIMEN SOURCE: SIGNIFICANT CHANGE UP

## 2021-02-16 NOTE — PATIENT PROFILE OB - PURPOSEFUL PROACTIVE ROUNDING
[facility]   Patient/Patient Representative

## 2021-04-30 NOTE — ED ADULT NURSE NOTE - NSFALLRSKUNASSIST_ED_ALL_ED
1. Change losartan to entresto 49/51mg twice daily   2. Add Jardiance 10mg daily   3. Decrease glyburide 2.5 twice daily   4. Cardiac MRI : eval for ABDI OP scheduling [phone]  5. EP refrrral for Antonio Linder   6. 2 week HF nurse , [facility],    7. 2 months     
no

## 2022-11-10 NOTE — ED ADULT NURSE NOTE - PRO INTERPRETER NEED 2
Patient spoke with the pharmacy and they do not have Ozempic is stock. The patient is requesting to have the Rx sent to [id number]. Pharmacy informed the patient that they cannot transfer the Rx. Please send to [facility]. Thank you.   English

## 2023-01-01 NOTE — PATIENT PROFILE OB - BILL OF RIGHTS/ADMISSION INFORMATION PROVIDED TO:
At bedside: Gave report to day shift nurse ALEYDA Max for continuity of care. Baby alert and in stable condition.   Patient

## 2023-10-24 NOTE — PATIENT PROFILE OB - TERM DELIVERIES, OB PROFILE
2 [place]ycl[place] Counseling:  I discussed with the patient the risks of imiquimod including but not limited to erythema, scaling, itching, weeping, crusting, and pain.  Patient understands that the inflammatory response to imiquimod is variable from person to person and was educated regarded proper titration schedule.  If flu-like symptoms develop, patient knows to discontinue the medication and contact us.

## 2025-01-02 NOTE — DISCHARGE NOTE OB - MEDICATION SUMMARY - MEDICATIONS TO TAKE
I will START or STAY ON the medications listed below when I get home from the hospital:    ibuprofen 600 mg oral tablet  -- 1 tab(s) by mouth every 6 hours, As Needed  -- Indication: For Pregnancy related condition    acetaminophen 325 mg oral tablet  -- 3 tab(s) by mouth every 6 hours, As Needed  -- Indication: For Pregnancy related condition    Prenatal Multivitamins with Folic Acid 1 mg oral tablet  -- 1 tab(s) by mouth once a day  -- Indication: For Pregnancy related condition details… normal/ROM intact/normal gait/strength 5/5 bilateral upper extremities/strength 5/5 bilateral lower extremities